# Patient Record
Sex: MALE | Race: WHITE | Employment: UNEMPLOYED | URBAN - METROPOLITAN AREA
[De-identification: names, ages, dates, MRNs, and addresses within clinical notes are randomized per-mention and may not be internally consistent; named-entity substitution may affect disease eponyms.]

---

## 2017-01-03 ENCOUNTER — TELEPHONE (OUTPATIENT)
Dept: FAMILY MEDICINE CLINIC | Facility: CLINIC | Age: 27
End: 2017-01-03

## 2017-01-03 RX ORDER — AZITHROMYCIN 250 MG/1
TABLET, FILM COATED ORAL
Qty: 6 TABLET | Refills: 0 | Status: CANCELLED | OUTPATIENT
Start: 2017-01-03

## 2017-01-03 NOTE — TELEPHONE ENCOUNTER
Left message for patient to call office back. Office phone number provided. Kenny Braswell pending per Dr. Ciarra Noriega. Will need to confirm pharmacy.

## 2017-01-03 NOTE — TELEPHONE ENCOUNTER
Patient states feels like someone is squeezing his chest when laying down. When breathes in, chest tickles and pain in back. No nasal drainage. ST  Tried warm humidity with no relief. Productive cough with dark mucous. Cough is waking him up at night.

## 2017-01-03 NOTE — TELEPHONE ENCOUNTER
Pt was in the office with sister and I asked him where he would like the medication to go to.  He states that he went to [facility] so no need for the medication- he was already put on a zpack

## 2017-01-04 ENCOUNTER — HOSPITAL ENCOUNTER (OUTPATIENT)
Age: 27
Discharge: ANOTHER HOSPITAL | End: 2017-01-04
Attending: FAMILY MEDICINE

## 2017-01-04 ENCOUNTER — TELEPHONE (OUTPATIENT)
Dept: FAMILY MEDICINE CLINIC | Facility: CLINIC | Age: 27
End: 2017-01-04

## 2017-01-04 VITALS
BODY MASS INDEX: 26.61 KG/M2 | HEIGHT: 75 IN | RESPIRATION RATE: 16 BRPM | OXYGEN SATURATION: 98 % | TEMPERATURE: 98 F | HEART RATE: 65 BPM | DIASTOLIC BLOOD PRESSURE: 83 MMHG | WEIGHT: 214 LBS | SYSTOLIC BLOOD PRESSURE: 137 MMHG

## 2017-01-04 DIAGNOSIS — R42 DIZZINESS: ICD-10-CM

## 2017-01-04 DIAGNOSIS — R06.02 SHORTNESS OF BREATH: ICD-10-CM

## 2017-01-04 DIAGNOSIS — R00.2 PALPITATIONS: Primary | ICD-10-CM

## 2017-01-04 DIAGNOSIS — R07.9 CHEST PAIN, UNSPECIFIED TYPE: ICD-10-CM

## 2017-01-04 PROCEDURE — 93010 ELECTROCARDIOGRAM REPORT: CPT

## 2017-01-04 PROCEDURE — 99205 OFFICE O/P NEW HI 60 MIN: CPT

## 2017-01-04 PROCEDURE — 93005 ELECTROCARDIOGRAM TRACING: CPT

## 2017-01-04 PROCEDURE — 99215 OFFICE O/P EST HI 40 MIN: CPT

## 2017-01-04 RX ORDER — FLUOCINONIDE 0.5 MG/G
OINTMENT TOPICAL
Qty: 30 G | Refills: 1 | Status: SHIPPED | OUTPATIENT
Start: 2017-01-04 | End: 2018-10-04

## 2017-01-05 ENCOUNTER — TELEPHONE (OUTPATIENT)
Dept: FAMILY MEDICINE CLINIC | Facility: CLINIC | Age: 27
End: 2017-01-05

## 2017-01-05 LAB
ATRIAL RATE: 70 BPM
P AXIS: 33 DEGREES
P-R INTERVAL: 144 MS
Q-T INTERVAL: 396 MS
QRS DURATION: 96 MS
QTC CALCULATION (BEZET): 427 MS
R AXIS: 46 DEGREES
T AXIS: 31 DEGREES
VENTRICULAR RATE: 70 BPM

## 2017-01-05 NOTE — ED INITIAL ASSESSMENT (HPI)
Pt states seen at a [address] for bronchitis. Given zpack and taking robitussin DM otc. States onset at 1800 tonight of sudden dizziness and lightheadedness. States he does not feel well. No cough or shortness of breath. States face feels hot and cold.  Sta

## 2017-01-05 NOTE — ED PROVIDER NOTES
[place] [id number] [facility] [place]   Patient presents with:  Arrythmia/Palpitations (cardiovascular)    Stated Complaint: DIZZINESS, HEART RACING    HPI    59-year-old male who presents to the clinic today with chief complaints o per week       Comment: social      Review of Systems    Positive for stated complaint: DIZZINESS, HEART RACING  Other systems are as noted in HPI. Constitutional and vital signs reviewed.       All other systems reviewed and negative except as noted above onset palpitations, lightheadedness, dizziness, chest pain, shortness of breath. Unexplained etiology. Patient was recently placed on Z-Jose. Cannot rule out arrhythmia secondary to QT prolongation from the Z-Jose.   Patient will need further  evaluation a

## 2017-01-09 ENCOUNTER — OFFICE VISIT (OUTPATIENT)
Dept: FAMILY MEDICINE CLINIC | Facility: CLINIC | Age: 27
End: 2017-01-09

## 2017-01-09 VITALS
DIASTOLIC BLOOD PRESSURE: 70 MMHG | SYSTOLIC BLOOD PRESSURE: 120 MMHG | WEIGHT: 219 LBS | HEART RATE: 82 BPM | BODY MASS INDEX: 28.41 KG/M2 | HEIGHT: 73.5 IN | OXYGEN SATURATION: 98 % | TEMPERATURE: 98 F

## 2017-01-09 DIAGNOSIS — G47.00 INSOMNIA, UNSPECIFIED TYPE: ICD-10-CM

## 2017-01-09 DIAGNOSIS — J01.80 OTHER ACUTE SINUSITIS, RECURRENCE NOT SPECIFIED: Primary | ICD-10-CM

## 2017-01-09 PROCEDURE — 99214 OFFICE O/P EST MOD 30 MIN: CPT | Performed by: FAMILY MEDICINE

## 2017-01-09 RX ORDER — AMOXICILLIN AND CLAVULANATE POTASSIUM 500; 125 MG/1; MG/1
TABLET, FILM COATED ORAL
Qty: 20 TABLET | Refills: 0 | Status: SHIPPED | OUTPATIENT
Start: 2017-01-09 | End: 2017-01-19

## 2017-01-09 NOTE — PROGRESS NOTES
Luis Antonio Be is a 32year old male. CC:  Patient presents with:  Cough: per pt   Chest Congestion  Sleep Problem      HPI:  The patient has primary complaint of nasal congestion for  3 days. Associated symptoms include productive cough.  The patient distress  EYE: B conjunctiva and lids normal  HENT: Nasal mucosa is boggy with discolored discharge, B pinnas, external auditory canals and tympanic membranes are normal. No oral lesions.    NECK: No lymphadenopathy, thyromegaly or masses  CAR: S1, S2 giselle

## 2017-01-21 ENCOUNTER — OFFICE VISIT (OUTPATIENT)
Dept: FAMILY MEDICINE CLINIC | Facility: CLINIC | Age: 27
End: 2017-01-21

## 2017-01-21 VITALS
HEART RATE: 52 BPM | DIASTOLIC BLOOD PRESSURE: 84 MMHG | BODY MASS INDEX: 29 KG/M2 | TEMPERATURE: 98 F | SYSTOLIC BLOOD PRESSURE: 124 MMHG | WEIGHT: 220.38 LBS | RESPIRATION RATE: 16 BRPM

## 2017-01-21 DIAGNOSIS — R00.2 PALPITATIONS: Primary | ICD-10-CM

## 2017-01-21 PROCEDURE — 99214 OFFICE O/P EST MOD 30 MIN: CPT | Performed by: FAMILY MEDICINE

## 2017-01-21 RX ORDER — METOPROLOL SUCCINATE 25 MG/1
25 TABLET, EXTENDED RELEASE ORAL DAILY
Qty: 30 TABLET | Refills: 0 | Status: SHIPPED | OUTPATIENT
Start: 2017-01-21 | End: 2017-01-21

## 2017-01-21 RX ORDER — METOPROLOL SUCCINATE 25 MG/1
25 TABLET, EXTENDED RELEASE ORAL DAILY
Qty: 30 TABLET | Refills: 0 | Status: SHIPPED | OUTPATIENT
Start: 2017-01-21 | End: 2017-03-02 | Stop reason: ALTCHOICE

## 2017-01-21 NOTE — PROGRESS NOTES
Armaan Kerns is a 32year old male. CC:  Patient presents with:  Palpitations: feels some SOB, per pt      HPI:  Palpitations still occurring. He has much stress in life of late.  Event monitor and echo results as follows:  PATIENT'S NAME:        Bess #:  [id number]  DATE OF BIRTH:          3/3/1990  DATE OF TEST:           12/2/2016  AGE AT TEST:             32 year old                                        STRESS ECHOCARDIOGRAM    Resting EKG:   Normal sinus rhythm.      STAGE          BP  HR    REST  (gastroesophageal reflux disease)    • Depression    • Reactive airway disease    • Allergic rhinitis    • Tobacco use    • ADD (attention deficit disorder)    • Actinomycosis      Hx of infection '92   • Congenital pulmonary valve stenosis    • Thalassemi mg total) by mouth daily. No Follow-up on file.                 Authorized by Demi Alcantar M.D.

## 2017-02-09 ENCOUNTER — TELEPHONE (OUTPATIENT)
Dept: FAMILY MEDICINE CLINIC | Facility: CLINIC | Age: 27
End: 2017-02-09

## 2017-02-09 RX ORDER — FLUOXETINE 10 MG/1
10 CAPSULE ORAL DAILY
Qty: 30 CAPSULE | Refills: 1 | Status: SHIPPED | OUTPATIENT
Start: 2017-02-09 | End: 2017-03-02 | Stop reason: ALTCHOICE

## 2017-02-09 NOTE — TELEPHONE ENCOUNTER
Prozac would be the initial med to try. Can call in Prozac 10 mg, 1 tab qd, #30, 1 rf and f/u in 4 weeks.  Thanks

## 2017-02-10 ENCOUNTER — TELEPHONE (OUTPATIENT)
Dept: FAMILY MEDICINE CLINIC | Facility: CLINIC | Age: 27
End: 2017-02-10

## 2017-03-02 ENCOUNTER — OFFICE VISIT (OUTPATIENT)
Dept: FAMILY MEDICINE CLINIC | Facility: CLINIC | Age: 27
End: 2017-03-02

## 2017-03-02 VITALS
BODY MASS INDEX: 29 KG/M2 | SYSTOLIC BLOOD PRESSURE: 126 MMHG | TEMPERATURE: 98 F | HEART RATE: 64 BPM | WEIGHT: 225 LBS | DIASTOLIC BLOOD PRESSURE: 70 MMHG

## 2017-03-02 DIAGNOSIS — Z00.00 WELL ADULT EXAM: Primary | ICD-10-CM

## 2017-03-02 LAB
CHOLEST SMN-MCNC: 153 MG/DL (ref ?–200)
HDLC SERPL-MCNC: 50 MG/DL (ref 45–?)
HDLC SERPL: 3.06 {RATIO} (ref ?–4.97)
LDLC SERPL CALC-MCNC: 74 MG/DL (ref ?–130)
NONHDLC SERPL-MCNC: 103 MG/DL (ref ?–130)
TRIGLYCERIDES: 144 MG/DL (ref ?–150)
VLDL: 29 MG/DL (ref 5–40)

## 2017-03-02 PROCEDURE — 99395 PREV VISIT EST AGE 18-39: CPT | Performed by: FAMILY MEDICINE

## 2017-03-02 PROCEDURE — 80061 LIPID PANEL: CPT | Performed by: FAMILY MEDICINE

## 2017-03-02 NOTE — PROGRESS NOTES
Latanya Carr is a 32year old male. CC:  Patient presents with:  Physical: per pt       HPI:  Yearly PX  Last lipid: 2013  Last Tdap: 2005  Last PSA: na  Last Colonoscopy: na    He is not taking Prozac for anxiety or Metoprolol for palpitations.  His conjunctiva and lids normal  HENT: normocephalic; normal nose, pharynx and TM's  NECK: No lymphadenopathy, thyromegaly or masses  CAR: S1, S2 normal, RRR; no S3, no S4; no click; murmur negative  PULM: clear to auscultation B, no accessory muscle use  GI:

## 2017-03-23 ENCOUNTER — TELEPHONE (OUTPATIENT)
Dept: FAMILY MEDICINE CLINIC | Facility: CLINIC | Age: 27
End: 2017-03-23

## 2017-03-23 NOTE — TELEPHONE ENCOUNTER
Well he can try some flonase 2 puffs per nostril at hs and some muscinex D bid for a few days, but one day of Sx is not appropriate for ABX

## 2017-03-23 NOTE — TELEPHONE ENCOUNTER
PT states he has some fatigue, lightheadedness, stuffy nose, phlegm and some sore throat. He is coughing a little, chest feels-\"tickly\". Pt has tried OTC daytime medicine. Forward to Dr. Lacy Merchant, please advise.

## 2017-03-23 NOTE — TELEPHONE ENCOUNTER
Well a Ilan Lapping going to "[facility] for a cold, how long has he had symptoms? And what has he tried so far?

## 2017-03-27 ENCOUNTER — TELEPHONE (OUTPATIENT)
Dept: FAMILY MEDICINE CLINIC | Facility: CLINIC | Age: 27
End: 2017-03-27

## 2017-03-27 ENCOUNTER — OFFICE VISIT (OUTPATIENT)
Dept: FAMILY MEDICINE CLINIC | Facility: CLINIC | Age: 27
End: 2017-03-27

## 2017-03-27 VITALS
SYSTOLIC BLOOD PRESSURE: 124 MMHG | DIASTOLIC BLOOD PRESSURE: 80 MMHG | BODY MASS INDEX: 29 KG/M2 | WEIGHT: 224 LBS | TEMPERATURE: 99 F | HEART RATE: 70 BPM

## 2017-03-27 DIAGNOSIS — R10.84 GENERALIZED ABDOMINAL PAIN: Primary | ICD-10-CM

## 2017-03-27 DIAGNOSIS — R10.32 LLQ ABDOMINAL PAIN: ICD-10-CM

## 2017-03-27 LAB
ALBUMIN SERPL-MCNC: 4.5 G/DL (ref 3.5–4.8)
ALP LIVER SERPL-CCNC: 64 U/L (ref 45–117)
ALT SERPL-CCNC: 34 U/L (ref 17–63)
AST SERPL-CCNC: 15 U/L (ref 15–41)
BASOPHILS # BLD AUTO: 0.05 X10(3) UL (ref 0–0.1)
BASOPHILS NFR BLD AUTO: 0.9 %
BILIRUB SERPL-MCNC: 0.5 MG/DL (ref 0.1–2)
BUN BLD-MCNC: 16 MG/DL (ref 8–20)
CALCIUM BLD-MCNC: 9.1 MG/DL (ref 8.3–10.3)
CHLORIDE: 105 MMOL/L (ref 101–111)
CO2: 28 MMOL/L (ref 22–32)
CREAT BLD-MCNC: 1.01 MG/DL (ref 0.7–1.3)
EOSINOPHIL # BLD AUTO: 0.18 X10(3) UL (ref 0–0.3)
EOSINOPHIL NFR BLD AUTO: 3.3 %
ERYTHROCYTE [DISTWIDTH] IN BLOOD BY AUTOMATED COUNT: 18.7 % (ref 11.5–16)
GLUCOSE BLD-MCNC: 124 MG/DL (ref 70–99)
HCT VFR BLD AUTO: 41 % (ref 37–53)
HGB BLD-MCNC: 12.4 G/DL (ref 13–17)
IMMATURE GRANULOCYTE COUNT: 0.02 X10(3) UL (ref 0–1)
IMMATURE GRANULOCYTE RATIO %: 0.4 %
LIPASE: 153 U/L (ref 73–393)
LYMPHOCYTES # BLD AUTO: 1.95 X10(3) UL (ref 0.9–4)
LYMPHOCYTES NFR BLD AUTO: 35.3 %
M PROTEIN MFR SERPL ELPH: 7.4 G/DL (ref 6.1–8.3)
MCH RBC QN AUTO: 18.2 PG (ref 27–33.2)
MCHC RBC AUTO-ENTMCNC: 30.2 G/DL (ref 31–37)
MCV RBC AUTO: 60.2 FL (ref 80–99)
MONOCYTES # BLD AUTO: 0.31 X10(3) UL (ref 0.1–0.6)
MONOCYTES NFR BLD AUTO: 5.6 %
NEUTROPHIL ABS PRELIM: 3.01 X10 (3) UL (ref 1.3–6.7)
NEUTROPHILS # BLD AUTO: 3.01 X10(3) UL (ref 1.3–6.7)
NEUTROPHILS NFR BLD AUTO: 54.5 %
PLATELET # BLD AUTO: 314 10(3)UL (ref 150–450)
POTASSIUM SERPL-SCNC: 3.9 MMOL/L (ref 3.6–5.1)
RBC # BLD AUTO: 6.81 X10(6)UL (ref 4.3–5.7)
RED CELL DISTRIBUTION WIDTH-SD: 32.7 FL (ref 35.1–46.3)
SODIUM SERPL-SCNC: 139 MMOL/L (ref 136–144)
WBC # BLD AUTO: 5.5 X10(3) UL (ref 4–13)

## 2017-03-27 PROCEDURE — 36415 COLL VENOUS BLD VENIPUNCTURE: CPT | Performed by: FAMILY MEDICINE

## 2017-03-27 PROCEDURE — 83690 ASSAY OF LIPASE: CPT | Performed by: FAMILY MEDICINE

## 2017-03-27 PROCEDURE — 80053 COMPREHEN METABOLIC PANEL: CPT | Performed by: FAMILY MEDICINE

## 2017-03-27 PROCEDURE — 99214 OFFICE O/P EST MOD 30 MIN: CPT | Performed by: FAMILY MEDICINE

## 2017-03-27 PROCEDURE — 85025 COMPLETE CBC W/AUTO DIFF WBC: CPT | Performed by: FAMILY MEDICINE

## 2017-03-27 RX ORDER — OMEPRAZOLE 40 MG/1
40 CAPSULE, DELAYED RELEASE ORAL DAILY
Qty: 30 CAPSULE | Refills: 0 | Status: SHIPPED | OUTPATIENT
Start: 2017-03-27 | End: 2018-03-22

## 2017-03-27 NOTE — TELEPHONE ENCOUNTER
Patient states he went to pharmacy and was told insurance will not cover omeprazole. Patient has medicaid insurnace. Advised patient medicaid will not cover any PPI for patients over 24years old. Will need to purchase OTC.     Per Dr Kristian Woodson, patient can

## 2017-03-27 NOTE — PROGRESS NOTES
Judy Banegas is a 32year old male. Patient presents with:  Abdominal Pain: per pt   Diarrhea      HPI:   H/o stomach problems, ulcers and scoped in the past. Last one was 3-4 yrs. Does not take any long-term medications. Pepto or tums once in a while. Former Smoker                   Packs/Day: 1.00  Years:           Quit date: 11/04/2014    Smokeless Status: Former User                         Quit date: 10/21/2014    Alcohol Use: No                 BP Readings from Last 6 Encounters:  03/27/17 : 124/80 Differential With Platelet  Standing Status: Future  Number of Occurrences: 1  Standing Expiration Date: 9/08/6434  Comp Metabolic Panel (14)  Standing Status: Future  Number of Occurrences: 1  Standing Expiration Date: 3/28/2018  Lipase [E]  Standing Stat

## 2017-03-29 ENCOUNTER — TELEPHONE (OUTPATIENT)
Dept: FAMILY MEDICINE CLINIC | Facility: CLINIC | Age: 27
End: 2017-03-29

## 2017-03-29 NOTE — TELEPHONE ENCOUNTER
Advised of labs, states his abdominal sx are worsening. He ended up purchasing prilosec OTC and is taking 4o mgm qd. Every time he eats , he feels bloated, nauseated. OV scheduled with Dr. Gertrudis Bhardwaj.

## 2017-04-01 ENCOUNTER — TELEPHONE (OUTPATIENT)
Dept: FAMILY MEDICINE CLINIC | Facility: CLINIC | Age: 27
End: 2017-04-01

## 2017-04-04 ENCOUNTER — OFFICE VISIT (OUTPATIENT)
Dept: FAMILY MEDICINE CLINIC | Facility: CLINIC | Age: 27
End: 2017-04-04

## 2017-04-04 VITALS
TEMPERATURE: 98 F | DIASTOLIC BLOOD PRESSURE: 80 MMHG | HEART RATE: 68 BPM | SYSTOLIC BLOOD PRESSURE: 114 MMHG | OXYGEN SATURATION: 99 % | RESPIRATION RATE: 16 BRPM | BODY MASS INDEX: 29 KG/M2 | WEIGHT: 221 LBS

## 2017-04-04 DIAGNOSIS — R10.9 ABDOMINAL PAIN, UNSPECIFIED LOCATION: Primary | ICD-10-CM

## 2017-04-04 DIAGNOSIS — R19.7 DIARRHEA, UNSPECIFIED TYPE: ICD-10-CM

## 2017-04-04 PROCEDURE — 99213 OFFICE O/P EST LOW 20 MIN: CPT | Performed by: FAMILY MEDICINE

## 2017-04-04 NOTE — PROGRESS NOTES
Veronica Huddleston is a 32year old male. CC:  Patient presents with:  Hospital F/U: from [facility] for abdominal pain per pt      HPI:  F/u [facility] ER visit for abd pain and diarrhea last week. It started the day after eating a raw steak.  He has lymphadenopathy, thyromegaly or masses  CAR: S1, S2 normal, RRR; no S3, no S4; no click; murmur negative  PULM: clear to auscultation B, no accessory muscle use  GI: normal bowel sounds, soft, mild epigastric and LLQ tenderness, no HSM, no R/G/G  PSYCH: al

## 2017-04-28 ENCOUNTER — OFFICE VISIT (OUTPATIENT)
Dept: FAMILY MEDICINE CLINIC | Facility: CLINIC | Age: 27
End: 2017-04-28

## 2017-04-28 VITALS
DIASTOLIC BLOOD PRESSURE: 80 MMHG | SYSTOLIC BLOOD PRESSURE: 124 MMHG | WEIGHT: 223.38 LBS | RESPIRATION RATE: 20 BRPM | HEART RATE: 64 BPM | TEMPERATURE: 98 F | BODY MASS INDEX: 29 KG/M2

## 2017-04-28 DIAGNOSIS — R09.81 NASAL CONGESTION: Primary | ICD-10-CM

## 2017-04-28 DIAGNOSIS — G47.00 INSOMNIA, UNSPECIFIED TYPE: ICD-10-CM

## 2017-04-28 DIAGNOSIS — F41.9 ANXIETY: ICD-10-CM

## 2017-04-28 DIAGNOSIS — R51.9 FACIAL PAIN: ICD-10-CM

## 2017-04-28 PROCEDURE — 99214 OFFICE O/P EST MOD 30 MIN: CPT | Performed by: FAMILY MEDICINE

## 2017-04-28 RX ORDER — ESCITALOPRAM OXALATE 10 MG/1
10 TABLET ORAL DAILY
Qty: 30 TABLET | Refills: 1 | Status: SHIPPED | OUTPATIENT
Start: 2017-04-28 | End: 2017-10-20

## 2017-04-28 RX ORDER — AZITHROMYCIN 250 MG/1
TABLET, FILM COATED ORAL
Qty: 6 TABLET | Refills: 0 | Status: SHIPPED | OUTPATIENT
Start: 2017-04-28 | End: 2017-05-03

## 2017-04-28 NOTE — PROGRESS NOTES
Judy Banegas is a 32year old male. CC:  Patient presents with:  Sore Throat: fatigued, stuffy, dizzy, dry mouth per pt      HPI:  The patient has primary complaint of facial pain   and nasal congestion for  1 week.  Associated symptoms include sore Reviewed by Precious Elizondo M.D.     Physical Exam:  GEN: well developed, well nourished, in no apparent distress  EYE: B conjunctiva and lids normal  HENT: B nares mildly boggy, + PND, R TM with serous effusion, L TM is normal   NECK: No lymphadenopathy, thyro

## 2017-05-01 ENCOUNTER — OFFICE VISIT (OUTPATIENT)
Dept: FAMILY MEDICINE CLINIC | Facility: CLINIC | Age: 27
End: 2017-05-01

## 2017-05-01 VITALS
BODY MASS INDEX: 29 KG/M2 | OXYGEN SATURATION: 97 % | RESPIRATION RATE: 14 BRPM | WEIGHT: 224 LBS | HEART RATE: 94 BPM | SYSTOLIC BLOOD PRESSURE: 130 MMHG | DIASTOLIC BLOOD PRESSURE: 84 MMHG | TEMPERATURE: 99 F

## 2017-05-01 DIAGNOSIS — J02.9 SORE THROAT: Primary | ICD-10-CM

## 2017-05-01 PROCEDURE — 99214 OFFICE O/P EST MOD 30 MIN: CPT | Performed by: FAMILY MEDICINE

## 2017-05-01 NOTE — PROGRESS NOTES
Caterina Short is a 32year old male. CC:  Patient presents with:  Sore Throat      HPI:  The patient has primary complaint of sore throat for  2 days. Associated symptoms include nasal congestion. The patient has not taken temperatures.  There is pauline lb  SpO2 97%   Reviewed by Jnei Lewis M.D.     Physical Exam:  GEN: well developed, well nourished, in no apparent distress  EYE: B conjunctiva and lids normal  HENT: mild OP erythema, B pinnas, external auditory canals and tympanic membranes are normal. B

## 2017-05-03 ENCOUNTER — TELEPHONE (OUTPATIENT)
Dept: FAMILY MEDICINE CLINIC | Facility: CLINIC | Age: 27
End: 2017-05-03

## 2017-05-03 RX ORDER — AMOXICILLIN AND CLAVULANATE POTASSIUM 500; 125 MG/1; MG/1
TABLET, FILM COATED ORAL
Qty: 20 TABLET | Refills: 0 | Status: SHIPPED | OUTPATIENT
Start: 2017-05-03 | End: 2017-10-20

## 2017-06-10 ENCOUNTER — TELEPHONE (OUTPATIENT)
Dept: FAMILY MEDICINE CLINIC | Facility: CLINIC | Age: 27
End: 2017-06-10

## 2017-06-10 NOTE — TELEPHONE ENCOUNTER
Called and spoke to pt symptoms started a couple days ago. States he has has bad [place] sores. States his tongue is white and scratchy feeling. Hard to swollen and talk. Patient is on his way to awake now and doesn't know how long he will be there.

## 2017-06-23 ENCOUNTER — TELEPHONE (OUTPATIENT)
Dept: FAMILY MEDICINE CLINIC | Facility: CLINIC | Age: 27
End: 2017-06-23

## 2017-06-23 NOTE — TELEPHONE ENCOUNTER
Started yesterday, was cutting hot peppers. Shortly after noticed the warm feeling in his hands, Maybe a little numbness. States he washed his hands and has showered after cutting the peppers. Could that be the cause of his sx.

## 2017-08-12 ENCOUNTER — TELEPHONE (OUTPATIENT)
Dept: FAMILY MEDICINE CLINIC | Facility: CLINIC | Age: 27
End: 2017-08-12

## 2017-10-13 ENCOUNTER — PATIENT OUTREACH (OUTPATIENT)
Dept: FAMILY MEDICINE CLINIC | Facility: CLINIC | Age: 27
End: 2017-10-13

## 2017-10-20 ENCOUNTER — OFFICE VISIT (OUTPATIENT)
Dept: FAMILY MEDICINE CLINIC | Facility: CLINIC | Age: 27
End: 2017-10-20

## 2017-10-20 ENCOUNTER — TELEPHONE (OUTPATIENT)
Dept: FAMILY MEDICINE CLINIC | Facility: CLINIC | Age: 27
End: 2017-10-20

## 2017-10-20 VITALS
SYSTOLIC BLOOD PRESSURE: 126 MMHG | DIASTOLIC BLOOD PRESSURE: 84 MMHG | HEIGHT: 75 IN | HEART RATE: 64 BPM | BODY MASS INDEX: 26.11 KG/M2 | RESPIRATION RATE: 16 BRPM | TEMPERATURE: 98 F | WEIGHT: 210 LBS

## 2017-10-20 DIAGNOSIS — G47.00 INSOMNIA, UNSPECIFIED TYPE: ICD-10-CM

## 2017-10-20 DIAGNOSIS — E83.51 HYPOCALCEMIA: ICD-10-CM

## 2017-10-20 DIAGNOSIS — R16.1 SPLENOMEGALY: ICD-10-CM

## 2017-10-20 DIAGNOSIS — R53.83 OTHER FATIGUE: Primary | ICD-10-CM

## 2017-10-20 DIAGNOSIS — Z30.09 SCREENING AND EVALUATION FOR VASECTOMY: ICD-10-CM

## 2017-10-20 DIAGNOSIS — F41.9 ANXIETY: ICD-10-CM

## 2017-10-20 PROCEDURE — 99214 OFFICE O/P EST MOD 30 MIN: CPT | Performed by: FAMILY MEDICINE

## 2017-10-20 PROCEDURE — 86308 HETEROPHILE ANTIBODY SCREEN: CPT | Performed by: FAMILY MEDICINE

## 2017-10-20 PROCEDURE — 82306 VITAMIN D 25 HYDROXY: CPT | Performed by: FAMILY MEDICINE

## 2017-10-20 NOTE — PROGRESS NOTES
Aurelia Benitez is a 32year old male. CC:  Patient presents with:  Fatigue: per pt      HPI:  F/u ER visits for a multitude of issues: fatigue, abd pain, dizziness, spots in the vision to name a few.  Labs did show mildly low calcium 2 times and his ba take the Lexapro  : would like vasectomy    Vitals: /84   Pulse 64   Temp 97.6 °F (36.4 °C) (Temporal)   Resp 16   Ht 75\"   Wt 210 lb   BMI 26.25 kg/m²    Reviewed by Ashly Steele M.D.     Physical Exam:  GEN: well developed, well nourished, in no a

## 2017-11-20 ENCOUNTER — TELEPHONE (OUTPATIENT)
Dept: FAMILY MEDICINE CLINIC | Facility: CLINIC | Age: 27
End: 2017-11-20

## 2017-11-20 NOTE — TELEPHONE ENCOUNTER
Called and spoke to patient, he states he is sore at the surgical site. Notified patient that that is typically normal. He states then he is having some pain in the right shoulder area not really having shortness of breath.  Notified patient that the pain i

## 2017-11-20 NOTE — TELEPHONE ENCOUNTER
Patient is scheduled for Wed 11/22/17 at 2:15 pm for a hospital admission follow up. Patient had an appendectomy done on 11/18/17 at [facility] by Dr Cabrera Anguiano. Patient has some nursing questions also and would like to speak with a nurse.

## 2017-11-22 ENCOUNTER — OFFICE VISIT (OUTPATIENT)
Dept: FAMILY MEDICINE CLINIC | Facility: CLINIC | Age: 27
End: 2017-11-22

## 2017-11-22 VITALS
BODY MASS INDEX: 26 KG/M2 | RESPIRATION RATE: 14 BRPM | HEART RATE: 66 BPM | TEMPERATURE: 97 F | SYSTOLIC BLOOD PRESSURE: 120 MMHG | DIASTOLIC BLOOD PRESSURE: 70 MMHG | OXYGEN SATURATION: 98 % | WEIGHT: 211.19 LBS

## 2017-11-22 DIAGNOSIS — Z90.49 S/P APPENDECTOMY: Primary | ICD-10-CM

## 2017-11-22 PROCEDURE — 99214 OFFICE O/P EST MOD 30 MIN: CPT | Performed by: FAMILY MEDICINE

## 2017-11-22 RX ORDER — TRAMADOL HYDROCHLORIDE 50 MG/1
50 TABLET ORAL EVERY 8 HOURS PRN
Qty: 20 TABLET | Refills: 0 | Status: SHIPPED | OUTPATIENT
Start: 2017-11-22 | End: 2018-10-04

## 2017-11-22 NOTE — PROGRESS NOTES
Kelle Fox is a 32year old male. CC:  Patient presents with:  Hospital F/U      HPI:  F/u appendectomy by Dr. Bruce Espinal at [place] last weekend. D/c'd on no meds. He is feeling better. He does note some pain with deep inspiration yet no SOB.  There i thyromegaly or masses  CAR: S1, S2 normal, RRR; no S3, no S4; no click; murmur negative  PULM: clear to auscultation B, no accessory muscle use  GI: normal bowels sounds, mild incisional tenderness, no HSM  PSYCH: alert and oriented x 3; affect appropriate

## 2018-07-28 ENCOUNTER — MED REC SCAN ONLY (OUTPATIENT)
Dept: FAMILY MEDICINE CLINIC | Facility: CLINIC | Age: 28
End: 2018-07-28

## 2018-10-04 ENCOUNTER — APPOINTMENT (OUTPATIENT)
Dept: GENERAL RADIOLOGY | Facility: HOSPITAL | Age: 28
End: 2018-10-04
Attending: EMERGENCY MEDICINE

## 2018-10-04 ENCOUNTER — HOSPITAL ENCOUNTER (EMERGENCY)
Facility: HOSPITAL | Age: 28
Discharge: HOME | End: 2018-10-04
Attending: EMERGENCY MEDICINE

## 2018-10-04 VITALS
OXYGEN SATURATION: 99 % | BODY MASS INDEX: 25.99 KG/M2 | HEIGHT: 75 IN | HEART RATE: 79 BPM | WEIGHT: 209 LBS | TEMPERATURE: 97 F | SYSTOLIC BLOOD PRESSURE: 125 MMHG | DIASTOLIC BLOOD PRESSURE: 85 MMHG | RESPIRATION RATE: 19 BRPM
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
## 2018-10-04 DIAGNOSIS — S20.211A CONTUSION OF RIGHT CHEST WALL, INITIAL ENCOUNTER: ICD-10-CM

## 2018-10-04 DIAGNOSIS — S29.019A STRAIN OF THORACIC REGION, INITIAL ENCOUNTER: ICD-10-CM

## 2018-10-04 PROCEDURE — 99284 EMERGENCY DEPT VISIT MOD MDM: CPT

## 2018-10-04 PROCEDURE — 93005 ELECTROCARDIOGRAM TRACING: CPT

## 2018-10-04 PROCEDURE — 72072 X-RAY EXAM THORAC SPINE 3VWS: CPT | Performed by: EMERGENCY MEDICINE

## 2018-10-04 PROCEDURE — 93010 ELECTROCARDIOGRAM REPORT: CPT

## 2018-10-04 PROCEDURE — 71101 X-RAY EXAM UNILAT RIBS/CHEST: CPT | Performed by: EMERGENCY MEDICINE

## 2018-10-04 RX ORDER — IBUPROFEN 600 MG/1
600 TABLET ORAL ONCE
Status: COMPLETED | OUTPATIENT
Start: 2018-10-04 | End: 2018-10-04

## 2018-10-04 RX ORDER — NAPROXEN 500 MG/1
500 TABLET ORAL 2 TIMES DAILY PRN
Qty: 20 TABLET | Refills: 0 | Status: SHIPPED | OUTPATIENT
Start: 2018-10-04

## 2018-10-04 RX ORDER — CYCLOBENZAPRINE HCL 10 MG
10 TABLET ORAL 3 TIMES DAILY PRN
Qty: 20 TABLET | Refills: 0 | Status: SHIPPED | OUTPATIENT
Start: 2018-10-04

## 2018-10-04 NOTE — ED PROVIDER NOTES
Patient Seen in: [facility] Emergency Department    History   Patient presents with:  Fall (musculoskeletal, neurologic)  Back Pain (musculoskeletal)    Stated Complaint: fall with back pain    HPI    24-year-old male presents to the emerge departme (36.2 °C)   Temp src 10/04/18 1323 Temporal   SpO2 10/04/18 1324 96 %   O2 Device 10/04/18 1323 None (Room air)       Current:/85   Pulse 79   Temp 97.2 °F (36.2 °C) (Temporal)   Resp 19   Ht 190.5 cm (6' 3\")   Wt 94.8 kg   SpO2 99%   BMI 26.12 kg/m initial encounter  (primary encounter diagnosis)  Strain of thoracic region, initial encounter  Contusion of right chest wall, initial encounter    Disposition:  Discharge  10/4/2018  3:32 pm    Follow-up:  Jearlean Lefort, MD  [address] [place] [address],[address]  [facility]

## 2020-12-17 ENCOUNTER — HOSPITAL ENCOUNTER (EMERGENCY)
Facility: HOSPITAL | Age: 30
Discharge: HOME | End: 2020-12-18
Attending: EMERGENCY MEDICINE
Payer: MEDICAID

## 2020-12-17 ENCOUNTER — APPOINTMENT (OUTPATIENT)
Dept: GENERAL RADIOLOGY | Facility: HOSPITAL | Age: 30
End: 2020-12-17
Attending: EMERGENCY MEDICINE
Payer: MEDICAID

## 2020-12-17 DIAGNOSIS — U07.1 COVID-19: Primary | ICD-10-CM

## 2020-12-17 DIAGNOSIS — R19.7 DIARRHEA, UNSPECIFIED TYPE: ICD-10-CM

## 2020-12-17 PROCEDURE — 99285 EMERGENCY DEPT VISIT HI MDM: CPT

## 2020-12-17 PROCEDURE — 36415 COLL VENOUS BLD VENIPUNCTURE: CPT

## 2020-12-17 PROCEDURE — 80053 COMPREHEN METABOLIC PANEL: CPT | Performed by: EMERGENCY MEDICINE

## 2020-12-17 PROCEDURE — 99284 EMERGENCY DEPT VISIT MOD MDM: CPT

## 2020-12-17 PROCEDURE — 71045 X-RAY EXAM CHEST 1 VIEW: CPT | Performed by: EMERGENCY MEDICINE

## 2020-12-17 PROCEDURE — 93005 ELECTROCARDIOGRAM TRACING: CPT

## 2020-12-17 PROCEDURE — 93010 ELECTROCARDIOGRAM REPORT: CPT

## 2020-12-17 PROCEDURE — 85025 COMPLETE CBC W/AUTO DIFF WBC: CPT | Performed by: EMERGENCY MEDICINE

## 2020-12-18 VITALS
WEIGHT: 225 LBS | RESPIRATION RATE: 18 BRPM | TEMPERATURE: 98 F | HEIGHT: 75 IN | SYSTOLIC BLOOD PRESSURE: 139 MMHG | HEART RATE: 61 BPM | BODY MASS INDEX: 27.98 KG/M2 | OXYGEN SATURATION: 99 % | DIASTOLIC BLOOD PRESSURE: 90 MMHG

## 2020-12-18 NOTE — ED INITIAL ASSESSMENT (HPI)
Patient tested COVID + last Friday. Since then has noticed an increased in WOB, along with a congested cough and a feeling of being dizzy/lightheaded when ambulating, accompanied with vomiting, nausea, and diarrhea. Currently patient is A&Ox4.

## 2020-12-18 NOTE — ED PROVIDER NOTES
Patient Seen in: [facility] Emergency Department      History   Patient presents with:  Covid  Difficulty Breathing    Stated Complaint: SOB; Fever; COVID + 12/11/20    HPI    71-year-old male presents reporting 9 days of cough, body aches, fevers. Resp 18   Ht 190.5 cm (6' 3\")   Wt 102.1 kg   SpO2 99%   BMI 28.12 kg/m²         Physical Exam    General:  Vitals as listed. No acute distress   HEENT: Sclerae anicteric. Conjunctivae show no pallor.   Oropharynx clear, mucous membranes moist   Neck: s Fever; COVID + 12/11/20  PATIENT STATED HISTORY: (As transcribed by Technologist)  Patient states postive for covid, here for all related symptoms. FINDINGS:  Lung volumes are satisfactory. No consolidation or pleural effusion.   Heart and pulmonary ves

## 2021-04-04 NOTE — ED INITIAL ASSESSMENT (HPI)
Pt states having issues with ex wife. Pt states plan was to hang himself. Pt states he tried to hang himself but the rope broke. Pt states he then began drinking and cutting his arms with an exacto knife.

## 2021-04-05 NOTE — ED PROVIDER NOTES
Patient Seen in: [facility] Emergency Department      History   Patient presents with:  Eval-P    Stated Complaint: SI    HPI/Subjective:   HPI    27-year-old male presents to the emergency department of his own volition for evaluation of suicidalit (Room air)       Current:BP (!) 156/96   Pulse 88   Temp 98.8 °F (37.1 °C) (Temporal)   Resp 16   Ht 190.5 cm (6' 3\")   Wt 98 kg   SpO2 96%   BMI 27.00 kg/m²         Physical Exam    General appearance: This is a young adult male sitting on a gurney.   Vit limits   SARS-COV-2/FLU A AND B/RSV BY PCR (GENEXPERT) - Normal    Narrative:      This test is intended for the qualitative detection and differentiation of SARS-CoV-2, influenza A, influenza B, and respiratory syncytial virus (RSV) viral RNA in nasopharyn Plan     Clinical Impression:  Suicidal ideation  (primary encounter diagnosis)  Depression, unspecified depression type  Superficial laceration    Disposition:  There is no disposition on file for this visit.   There is no disposition time on file for this

## 2021-04-05 NOTE — ED QUICK NOTES
Spoke to [place] from [place] at [phone]. She needs copy of petition and certification faxed to her and she will fax us back her assessment.

## 2021-04-05 NOTE — ED QUICK NOTES
Spoke with Javier Murrell at "[facility]. Excepting facility "[facility] Physician Dr. Monika Stacy, room number Intake. Discussed above with patient and he stated understanding and comfort with plan of care.

## 2021-04-05 NOTE — ED QUICK NOTES
[facility] HERE FOR TRANSPORT TO [address] [facility], PT SECURED BELONGINGS BAG [id number] AND BLACK SUIT CASE GIVEN TO THEM WITH ORIGINAL PAPER WORK.

## (undated) NOTE — ED AVS SNAPSHOT
Burnice Postin   MRN: [id number]    Department:  [facility] Emergency Department   Date of Visit:  10/4/2018           Disclosure     Insurance plans vary and the physician(s) referred by the ER may not be covered by your plan.  Please contact your tell this physician (or your personal doctor if your instructions are to return to your personal doctor) about any new or lasting problems. The primary care or specialist physician will see patients referred from the [facility] Emergency Department.  Arthor Bernheim